# Patient Record
Sex: FEMALE
[De-identification: names, ages, dates, MRNs, and addresses within clinical notes are randomized per-mention and may not be internally consistent; named-entity substitution may affect disease eponyms.]

---

## 2019-10-29 ENCOUNTER — APPOINTMENT (OUTPATIENT)
Dept: ENDOCRINOLOGY | Facility: CLINIC | Age: 59
End: 2019-10-29
Payer: COMMERCIAL

## 2019-10-29 VITALS
HEART RATE: 70 BPM | HEIGHT: 63 IN | SYSTOLIC BLOOD PRESSURE: 119 MMHG | DIASTOLIC BLOOD PRESSURE: 73 MMHG | BODY MASS INDEX: 24.8 KG/M2 | WEIGHT: 140 LBS

## 2019-10-29 DIAGNOSIS — R39.15 URGENCY OF URINATION: ICD-10-CM

## 2019-10-29 DIAGNOSIS — E03.9 HYPOTHYROIDISM, UNSPECIFIED: ICD-10-CM

## 2019-10-29 DIAGNOSIS — Z80.9 FAMILY HISTORY OF MALIGNANT NEOPLASM, UNSPECIFIED: ICD-10-CM

## 2019-10-29 DIAGNOSIS — J38.1 POLYP OF VOCAL CORD AND LARYNX: ICD-10-CM

## 2019-10-29 DIAGNOSIS — Z78.9 OTHER SPECIFIED HEALTH STATUS: ICD-10-CM

## 2019-10-29 PROCEDURE — 99205 OFFICE O/P NEW HI 60 MIN: CPT

## 2019-10-29 RX ORDER — PSYLLIUM HUSK 0.4 G
CAPSULE ORAL
Refills: 0 | Status: ACTIVE | COMMUNITY

## 2019-10-30 NOTE — HISTORY OF PRESENT ILLNESS
[FreeTextEntry1] : 59 year old female with PMH significant for vocal cord polyps who presents for new patient evaluation of hypothyroidism and ?thyroid nodules.\par Patient presented recently with pain and swelling in hands.  She reports she was evaluated for RA but this was ruled out.  She was found to have TSH of ~7 when her physician in [place] checked labs.  Since then, has had two subsequent episodes of elevated TSH.  She denies constipation, cold intolerance, +heat intolerance, denies fatigue, weight change.  States she has no sx of thyroid dysfunction.  No FH thyroid disorders or other autoimmune conditions.\par Had US by sonographer in [place] which was noted to have thyroid nodules.  Had repeat US in [place] which was noted to be unremarkable.  She denies neck pain or dysphagia.  She does note that she has a raspy voice but does have vocal cord polpys.  No FH thyroid cancer\par \par Patient states she already had a flu shot this year

## 2019-10-30 NOTE — DATA REVIEWED
[FreeTextEntry1] : I reviewed lab studies summarized below\par Jul 9, 2019\par gluc 96\par Cr 0.83\par Tchol 212, HDL 87, , TG 89, Vit D 62\par TSH 7.12\par \par 10/3/19\par gluc fasting 100\par Tchol 274, HDL 99, , TG 88\par TSH 26.54, T4 free 0.8, TT3 79\par TPO <1, TgAb <1\par A1C 5.7%\par \par 10/25/19\par TSH 13.52\par FT4 1.1\par TT3 92\par \par I also reviewed ultrasounds\par 8/27/19 report and images: Per my interpretation, heterogeneous atrophic thyroid with ?pseudonodules\par Per sonographer report, L lobe 15 mm "pseudonodulare"\par 10/25/19 "unremarkable thyroid ultrasound"

## 2019-10-30 NOTE — ASSESSMENT
[FreeTextEntry1] : Ms Taty Spaulding is a 59 year old female with hx vocal cord polyps who presents for new patient evaluation of elevated TSH.\par \par Elevated TSH\par TSH elevated on three occasions.  T4 and T3 normal.  TPO and TgAb negative\par US from Aug 2019 done in [place] seems to demonstrate heterogeneous background thyroid on my review of images although limited images available.  Given US findings and elevated TSH, I do believe she has thyroid dysfunction and recommended treatment with levothyroxine.  She is relatively asymptomatic at this time with no sx of thyroid hormone deficiency aside from joint pain in hands which may or may not be related.\par Discussed that weight based dose is ~100 mcg daily, but given lack of sx and patient preference, will start with low dose levothyroxine 50 mcg daily.  Discussed proper way to take thyroid hormone- at least 30 mins before food in the AM and 4 hrs separate from her vitamins.  \par Would repeat thyroid function tests in 6-8 wks on this dose and adjust as necessary.  She will return to [place] in December but has very good healthcare there.\par She does not need dedicated radiographic follow up for thyroid pseudonodules.  Discussed this is common finding in autoimmune thyroid disease.\par I think it's likely that her hypercholesterolemia is related to hypothyroidism and is likely to improve with thyroid hormone supplementation \par \par +Leuk esterase and WBC on UA, neg nitrites \par Patient repots chronic urinary urgency which she attributes to small bladder.  Also had hx traumatic vaginal birth.\par Denies dysuria, fever, chills, flank pain\par Likely does not need tx for equivocal results in asympatomic patient.  She will follow up with PCP and provider if sx return.  I also gave her a script for PRN UA.\par \par Prediabetes\par Discussed significance of elevated A1C \par She is already following a very low carb diet, very minimal sugar intake.  Exercise includes yoga and pilates with some biking.  Recommended 150 mins weekly of CV exercise.\par \par RTC 6 weeks

## 2019-10-30 NOTE — CONSULT LETTER
[Dear  ___] : Dear  [unfilled], [Courtesy Letter:] : I had the pleasure of seeing your patient, [unfilled], in my office today. [Please see my note below.] : Please see my note below. [Sincerely,] : Sincerely, [FreeTextEntry2] : Dr Conway Saw\par  [address], [place] [address]\par  [FreeTextEntry3] : Kristian Soliz NP\par Nurse Practitioner\par [facility]\par [address]\[place] [address]\par Tel: [phone]

## 2019-11-26 ENCOUNTER — APPOINTMENT (OUTPATIENT)
Dept: ENDOCRINOLOGY | Facility: CLINIC | Age: 59
End: 2019-11-26

## 2019-12-10 ENCOUNTER — APPOINTMENT (OUTPATIENT)
Dept: ENDOCRINOLOGY | Facility: CLINIC | Age: 59
End: 2019-12-10

## 2020-04-02 DIAGNOSIS — Z87.440 PERSONAL HISTORY OF URINARY (TRACT) INFECTIONS: ICD-10-CM

## 2020-04-02 RX ORDER — CIPROFLOXACIN HYDROCHLORIDE 500 MG/1
500 TABLET, FILM COATED ORAL TWICE DAILY
Qty: 10 | Refills: 1 | Status: ACTIVE | COMMUNITY
Start: 2020-04-02 | End: 1900-01-01

## 2020-12-23 PROBLEM — Z87.440 HISTORY OF URINARY TRACT INFECTION: Status: RESOLVED | Noted: 2020-04-02 | Resolved: 2020-12-23

## 2021-03-11 ENCOUNTER — RX RENEWAL (OUTPATIENT)
Age: 61
End: 2021-03-11

## 2021-03-11 RX ORDER — LEVOTHYROXINE SODIUM 0.05 MG/1
50 TABLET ORAL DAILY
Qty: 30 | Refills: 0 | Status: ACTIVE | COMMUNITY
Start: 2019-10-29 | End: 1900-01-01

## 2021-10-18 ENCOUNTER — APPOINTMENT (OUTPATIENT)
Dept: ENDOCRINOLOGY | Facility: CLINIC | Age: 61
End: 2021-10-18

## 2021-10-19 ENCOUNTER — APPOINTMENT (OUTPATIENT)
Dept: ENDOCRINOLOGY | Facility: CLINIC | Age: 61
End: 2021-10-19

## 2023-11-15 ENCOUNTER — NON-APPOINTMENT (OUTPATIENT)
Age: 63
End: 2023-11-15

## 2023-11-16 DIAGNOSIS — N39.0 URINARY TRACT INFECTION, SITE NOT SPECIFIED: ICD-10-CM

## 2023-11-16 DIAGNOSIS — R31.9 URINARY TRACT INFECTION, SITE NOT SPECIFIED: ICD-10-CM

## 2023-11-16 RX ORDER — NITROFURANTOIN MACROCRYSTALS 100 MG/1
100 CAPSULE ORAL
Qty: 10 | Refills: 1 | Status: ACTIVE | COMMUNITY
Start: 2023-11-16 | End: 1900-01-01